# Patient Record
Sex: FEMALE | Race: WHITE | NOT HISPANIC OR LATINO | Employment: FULL TIME | URBAN - METROPOLITAN AREA
[De-identification: names, ages, dates, MRNs, and addresses within clinical notes are randomized per-mention and may not be internally consistent; named-entity substitution may affect disease eponyms.]

---

## 2018-05-01 ENCOUNTER — HISTORICAL (OUTPATIENT)
Dept: ADMINISTRATIVE | Facility: HOSPITAL | Age: 53
End: 2018-05-01

## 2018-05-01 LAB
ABS NEUT (OLG): 4.3
ALBUMIN SERPL-MCNC: 4.1 GM/DL (ref 3.4–5)
ALBUMIN/GLOB SERPL: 1.58 {RATIO} (ref 1.5–2.5)
ALP SERPL-CCNC: 48 UNIT/L (ref 38–126)
ALT SERPL-CCNC: 11 UNIT/L (ref 7–52)
APPEARANCE, UA: ABNORMAL
AST SERPL-CCNC: 15 UNIT/L (ref 15–37)
BACTERIA #/AREA URNS AUTO: ABNORMAL /HPF
BILIRUB SERPL-MCNC: 0.5 MG/DL (ref 0.2–1)
BILIRUB UR QL STRIP: NEGATIVE MG/DL
BILIRUBIN DIRECT+TOT PNL SERPL-MCNC: 0.1 MG/DL (ref 0–0.5)
BUN SERPL-MCNC: 13 MG/DL (ref 7–18)
CALCIUM SERPL-MCNC: 9 MG/DL (ref 8.5–10)
CHLORIDE SERPL-SCNC: 105 MMOL/L (ref 98–107)
CHOLEST SERPL-MCNC: 187 MG/DL (ref 0–200)
CHOLEST/HDLC SERPL: 3.1 {RATIO}
CO2 SERPL-SCNC: 32 MMOL/L (ref 21–32)
COLOR UR: YELLOW
CREAT SERPL-MCNC: 0.71 MG/DL (ref 0.6–1.3)
CREAT/UREA NIT SERPL: 18.3
DEPRECATED CALCIDIOL+CALCIFEROL SERPL-MC: 32 NG/ML (ref 30–80)
ERYTHROCYTE [DISTWIDTH] IN BLOOD BY AUTOMATED COUNT: 12.3 % (ref 11.5–17)
GGT SERPL-CCNC: 10 UNIT/L (ref 5–85)
GLOBULIN SER-MCNC: 2.6 GM/DL (ref 1.2–3)
GLUCOSE (UA): NEGATIVE MG/DL
GLUCOSE SERPL-MCNC: 98 MG/DL (ref 74–106)
HCT VFR BLD AUTO: 42.5 % (ref 37–47)
HDLC SERPL-MCNC: 61 MG/DL (ref 35–60)
HGB BLD-MCNC: 14 GM/DL (ref 12–16)
HGB UR QL STRIP: ABNORMAL UNIT/L
KETONES UR QL STRIP: NEGATIVE MG/DL
LDH SERPL-CCNC: 156 UNIT/L (ref 140–271)
LDLC SERPL CALC-MCNC: 100 MG/DL (ref 0–129)
LEUKOCYTE ESTERASE UR QL STRIP: NEGATIVE UNIT/L
LYMPHOCYTES # BLD AUTO: 2.5 X10(3)/MCL (ref 0.6–3.4)
LYMPHOCYTES NFR BLD AUTO: 32.4 % (ref 13–40)
MCH RBC QN AUTO: 31 PG (ref 27–31.2)
MCHC RBC AUTO-ENTMCNC: 33 GM/DL (ref 32–36)
MCV RBC AUTO: 94 FL (ref 80–94)
MONOCYTES # BLD AUTO: 0.8 X10(3)/MCL (ref 0–1.8)
MONOCYTES NFR BLD AUTO: 11 % (ref 0.1–24)
NEUTROPHILS NFR BLD AUTO: 56.6 % (ref 47–80)
NITRITE UR QL STRIP.AUTO: NEGATIVE
PH UR STRIP: 7 [PH]
PLATELET # BLD AUTO: 431 X10(3)/MCL (ref 130–400)
PMV BLD AUTO: 9.1 FL
POTASSIUM SERPL-SCNC: 4.4 MMOL/L (ref 3.5–5.1)
PROT SERPL-MCNC: 6.7 GM/DL (ref 6.4–8.2)
PROT UR QL STRIP: NEGATIVE MG/DL
RBC # BLD AUTO: 4.51 X10(6)/MCL (ref 4.2–5.4)
RBC #/AREA URNS HPF: ABNORMAL /HPF
SODIUM SERPL-SCNC: 138 MMOL/L (ref 136–145)
SP GR UR STRIP: 1.02
SQUAMOUS EPITHELIAL, UA: ABNORMAL /LPF
TRIGL SERPL-MCNC: 66 MG/DL (ref 30–150)
TSH SERPL-ACNC: 1.66 MIU/ML (ref 0.35–4.94)
UROBILINOGEN UR STRIP-ACNC: 0.2 MG/DL
VLDLC SERPL CALC-MCNC: 13.2 MG/DL
WBC # SPEC AUTO: 7.6 X10(3)/MCL (ref 4.5–11.5)
WBC #/AREA URNS AUTO: ABNORMAL /[HPF]

## 2018-07-17 ENCOUNTER — HISTORICAL (OUTPATIENT)
Dept: LAB | Facility: HOSPITAL | Age: 53
End: 2018-07-17

## 2019-05-09 ENCOUNTER — HISTORICAL (OUTPATIENT)
Dept: ADMINISTRATIVE | Facility: HOSPITAL | Age: 54
End: 2019-05-09

## 2019-05-09 LAB
ABS NEUT (OLG): 4.6 X10(3)/MCL (ref 2.1–9.2)
ALBUMIN SERPL-MCNC: 4.1 GM/DL (ref 3.4–5)
ALBUMIN/GLOB SERPL: 1.41 {RATIO} (ref 1.5–2.5)
ALP SERPL-CCNC: 67 UNIT/L (ref 38–126)
ALT SERPL-CCNC: 12 UNIT/L (ref 7–52)
APPEARANCE, UA: CLEAR
AST SERPL-CCNC: 17 UNIT/L (ref 15–37)
BACTERIA #/AREA URNS AUTO: ABNORMAL /HPF
BILIRUB SERPL-MCNC: 0.4 MG/DL (ref 0.2–1)
BILIRUB UR QL STRIP: NEGATIVE MG/DL
BILIRUBIN DIRECT+TOT PNL SERPL-MCNC: 0.1 MG/DL (ref 0–0.5)
BILIRUBIN DIRECT+TOT PNL SERPL-MCNC: 0.3 MG/DL
BUN SERPL-MCNC: 11 MG/DL (ref 7–18)
CALCIUM SERPL-MCNC: 9.2 MG/DL (ref 8.5–10)
CHLORIDE SERPL-SCNC: 102 MMOL/L (ref 98–107)
CHOLEST SERPL-MCNC: 178 MG/DL (ref 0–200)
CHOLEST/HDLC SERPL: 2.4 {RATIO}
CO2 SERPL-SCNC: 32 MMOL/L (ref 21–32)
COLOR UR: ABNORMAL
CREAT SERPL-MCNC: 0.72 MG/DL (ref 0.6–1.3)
DEPRECATED CALCIDIOL+CALCIFEROL SERPL-MC: 34.2 NG/ML (ref 30–80)
ERYTHROCYTE [DISTWIDTH] IN BLOOD BY AUTOMATED COUNT: 12.8 % (ref 11.5–17)
GLOBULIN SER-MCNC: 2.9 GM/DL (ref 1.2–3)
GLUCOSE (UA): NEGATIVE MG/DL
GLUCOSE SERPL-MCNC: 99 MG/DL (ref 74–106)
HCT VFR BLD AUTO: 39.4 % (ref 37–47)
HDLC SERPL-MCNC: 74 MG/DL (ref 35–60)
HGB BLD-MCNC: 13.2 GM/DL (ref 12–16)
HGB UR QL STRIP: ABNORMAL UNIT/L
KETONES UR QL STRIP: NEGATIVE MG/DL
LDLC SERPL CALC-MCNC: 77 MG/DL (ref 0–129)
LEUKOCYTE ESTERASE UR QL STRIP: NEGATIVE UNIT/L
LYMPHOCYTES # BLD AUTO: 3 X10(3)/MCL (ref 0.6–3.4)
LYMPHOCYTES NFR BLD AUTO: 36 % (ref 13–40)
MCH RBC QN AUTO: 30.9 PG (ref 27–31.2)
MCHC RBC AUTO-ENTMCNC: 34 GM/DL (ref 32–36)
MCV RBC AUTO: 92 FL (ref 80–94)
MONOCYTES # BLD AUTO: 0.7 X10(3)/MCL (ref 0.1–1.3)
MONOCYTES NFR BLD AUTO: 8.9 % (ref 0.1–24)
NEUTROPHILS NFR BLD AUTO: 55.1 % (ref 47–80)
NITRITE UR QL STRIP.AUTO: NEGATIVE
PH UR STRIP: 8 [PH]
PLATELET # BLD AUTO: 405 X10(3)/MCL (ref 130–400)
PMV BLD AUTO: 8.2 FL (ref 9.4–12.4)
POTASSIUM SERPL-SCNC: 4.3 MMOL/L (ref 3.5–5.1)
PROT SERPL-MCNC: 7 GM/DL (ref 6.4–8.2)
PROT UR QL STRIP: NEGATIVE MG/DL
RBC # BLD AUTO: 4.27 X10(6)/MCL (ref 4.2–5.4)
RBC #/AREA URNS HPF: ABNORMAL /HPF
SODIUM SERPL-SCNC: 138 MMOL/L (ref 136–145)
SP GR UR STRIP: 1.01
SQUAMOUS EPITHELIAL, UA: ABNORMAL /LPF
TRIGL SERPL-MCNC: 60 MG/DL (ref 30–150)
TSH SERPL-ACNC: 1.53 MIU/ML (ref 0.35–4.94)
UROBILINOGEN UR STRIP-ACNC: 0.2 MG/DL
VLDLC SERPL CALC-MCNC: 12 MG/DL
WBC # SPEC AUTO: 8.3 X10(3)/MCL (ref 4.5–11.5)
WBC #/AREA URNS AUTO: ABNORMAL /[HPF]

## 2020-05-12 ENCOUNTER — HISTORICAL (OUTPATIENT)
Dept: ADMINISTRATIVE | Facility: HOSPITAL | Age: 55
End: 2020-05-12

## 2020-05-12 LAB
ABS NEUT (OLG): 3.6 X10(3)/MCL (ref 2.1–9.2)
ALBUMIN SERPL-MCNC: 4.2 GM/DL (ref 3.4–5)
ALBUMIN/GLOB SERPL: 1.62 {RATIO} (ref 1.5–2.5)
ALP SERPL-CCNC: 58 UNIT/L (ref 38–126)
ALT SERPL-CCNC: 12 UNIT/L (ref 7–52)
APPEARANCE, UA: CLEAR
AST SERPL-CCNC: 18 UNIT/L (ref 15–37)
BACTERIA #/AREA URNS AUTO: ABNORMAL /HPF
BILIRUB SERPL-MCNC: 0.4 MG/DL (ref 0.2–1)
BILIRUB UR QL STRIP: NEGATIVE MG/DL
BILIRUBIN DIRECT+TOT PNL SERPL-MCNC: 0.1 MG/DL (ref 0–0.5)
BILIRUBIN DIRECT+TOT PNL SERPL-MCNC: 0.3 MG/DL
BUN SERPL-MCNC: 14 MG/DL (ref 7–18)
CALCIUM SERPL-MCNC: 9.6 MG/DL (ref 8.5–10)
CHLORIDE SERPL-SCNC: 103 MMOL/L (ref 98–107)
CHOLEST SERPL-MCNC: 171 MG/DL (ref 0–200)
CHOLEST/HDLC SERPL: 2.6 {RATIO}
CO2 SERPL-SCNC: 35 MMOL/L (ref 21–32)
COLOR UR: YELLOW
CREAT SERPL-MCNC: 0.65 MG/DL (ref 0.6–1.3)
DEPRECATED CALCIDIOL+CALCIFEROL SERPL-MC: 34.1 NG/ML (ref 30–80)
ERYTHROCYTE [DISTWIDTH] IN BLOOD BY AUTOMATED COUNT: 12.8 % (ref 11.5–17)
GLOBULIN SER-MCNC: 2.6 GM/DL (ref 1.2–3)
GLUCOSE (UA): NEGATIVE MG/DL
GLUCOSE SERPL-MCNC: 99 MG/DL (ref 74–106)
HCT VFR BLD AUTO: 42.1 % (ref 37–47)
HDLC SERPL-MCNC: 67 MG/DL (ref 35–60)
HGB BLD-MCNC: 13.8 GM/DL (ref 12–16)
HGB UR QL STRIP: ABNORMAL UNIT/L
KETONES UR QL STRIP: NEGATIVE MG/DL
LDLC SERPL CALC-MCNC: 90 MG/DL (ref 0–129)
LEUKOCYTE ESTERASE UR QL STRIP: NEGATIVE UNIT/L
LYMPHOCYTES # BLD AUTO: 2.7 X10(3)/MCL (ref 0.6–3.4)
LYMPHOCYTES NFR BLD AUTO: 39 % (ref 13–40)
MCH RBC QN AUTO: 30.1 PG (ref 27–31.2)
MCHC RBC AUTO-ENTMCNC: 33 GM/DL (ref 32–36)
MCV RBC AUTO: 92 FL (ref 80–94)
MONOCYTES # BLD AUTO: 0.7 X10(3)/MCL (ref 0.1–1.3)
MONOCYTES NFR BLD AUTO: 9.3 % (ref 0.1–24)
NEUTROPHILS NFR BLD AUTO: 51.7 % (ref 47–80)
NITRITE UR QL STRIP.AUTO: NEGATIVE
PH UR STRIP: 7.5 [PH]
PLATELET # BLD AUTO: 407 X10(3)/MCL (ref 130–400)
PMV BLD AUTO: 8.9 FL (ref 9.4–12.4)
POTASSIUM SERPL-SCNC: 4.7 MMOL/L (ref 3.5–5.1)
PROT SERPL-MCNC: 6.8 GM/DL (ref 6.4–8.2)
PROT UR QL STRIP: NEGATIVE MG/DL
RBC # BLD AUTO: 4.59 X10(6)/MCL (ref 4.2–5.4)
RBC #/AREA URNS HPF: ABNORMAL /HPF
SODIUM SERPL-SCNC: 141 MMOL/L (ref 136–145)
SP GR UR STRIP: 1.01
SQUAMOUS EPITHELIAL, UA: ABNORMAL /LPF
TRIGL SERPL-MCNC: 53 MG/DL (ref 30–150)
TSH SERPL-ACNC: 1.11 MIU/ML (ref 0.35–4.94)
UROBILINOGEN UR STRIP-ACNC: 0.2 MG/DL
VLDLC SERPL CALC-MCNC: 10.6 MG/DL
WBC # SPEC AUTO: 7 X10(3)/MCL (ref 4.5–11.5)
WBC #/AREA URNS AUTO: ABNORMAL /[HPF]

## 2020-08-07 LAB
ABS NEUT (OLG): 5.07 X10(3)/MCL (ref 2.1–9.2)
ALBUMIN SERPL-MCNC: 4.1 GM/DL (ref 3.5–5)
ALBUMIN/GLOB SERPL: 1.4 RATIO (ref 1.1–2)
ALP SERPL-CCNC: 77 UNIT/L (ref 40–150)
ALT SERPL-CCNC: 14 UNIT/L (ref 0–55)
AST SERPL-CCNC: 19 UNIT/L (ref 5–34)
BASOPHILS # BLD AUTO: 0.1 X10(3)/MCL (ref 0–0.2)
BASOPHILS NFR BLD AUTO: 1 %
BILIRUB SERPL-MCNC: 0.7 MG/DL
BILIRUBIN DIRECT+TOT PNL SERPL-MCNC: 0.3 MG/DL (ref 0–0.5)
BILIRUBIN DIRECT+TOT PNL SERPL-MCNC: 0.4 MG/DL (ref 0–0.8)
BUN SERPL-MCNC: 11.3 MG/DL (ref 9.8–20.1)
CALCIUM SERPL-MCNC: 9.4 MG/DL (ref 8.4–10.2)
CHLORIDE SERPL-SCNC: 104 MMOL/L (ref 98–107)
CO2 SERPL-SCNC: 31 MMOL/L (ref 22–29)
CREAT SERPL-MCNC: 0.75 MG/DL (ref 0.55–1.02)
EOSINOPHIL # BLD AUTO: 0.3 X10(3)/MCL (ref 0–0.9)
EOSINOPHIL NFR BLD AUTO: 4 %
ERYTHROCYTE [DISTWIDTH] IN BLOOD BY AUTOMATED COUNT: 12.8 % (ref 11.5–17)
GLOBULIN SER-MCNC: 3 GM/DL (ref 2.4–3.5)
GLUCOSE SERPL-MCNC: 93 MG/DL (ref 74–100)
HCT VFR BLD AUTO: 43.6 % (ref 37–47)
HGB BLD-MCNC: 14.2 GM/DL (ref 12–16)
LYMPHOCYTES # BLD AUTO: 2.6 X10(3)/MCL (ref 0.6–4.6)
LYMPHOCYTES NFR BLD AUTO: 29 %
MCH RBC QN AUTO: 30.5 PG (ref 27–31)
MCHC RBC AUTO-ENTMCNC: 32.6 GM/DL (ref 33–36)
MCV RBC AUTO: 93.6 FL (ref 80–94)
MONOCYTES # BLD AUTO: 0.8 X10(3)/MCL (ref 0.1–1.3)
MONOCYTES NFR BLD AUTO: 9 %
NEUTROPHILS # BLD AUTO: 5.07 X10(3)/MCL (ref 2.1–9.2)
NEUTROPHILS NFR BLD AUTO: 57 %
PLATELET # BLD AUTO: 424 X10(3)/MCL (ref 130–400)
PMV BLD AUTO: 9.5 FL (ref 9.4–12.4)
POTASSIUM SERPL-SCNC: 4.5 MMOL/L (ref 3.5–5.1)
PROT SERPL-MCNC: 7.1 GM/DL (ref 6.4–8.3)
RBC # BLD AUTO: 4.66 X10(6)/MCL (ref 4.2–5.4)
SODIUM SERPL-SCNC: 141 MMOL/L (ref 136–145)
WBC # SPEC AUTO: 8.9 X10(3)/MCL (ref 4.5–11.5)

## 2020-08-12 ENCOUNTER — HISTORICAL (OUTPATIENT)
Dept: SURGERY | Facility: HOSPITAL | Age: 55
End: 2020-08-12

## 2020-08-12 LAB — POC BETA-HCG (QUAL): NEGATIVE

## 2021-05-24 ENCOUNTER — HISTORICAL (OUTPATIENT)
Dept: ADMINISTRATIVE | Facility: HOSPITAL | Age: 56
End: 2021-05-24

## 2021-05-24 LAB
ABS NEUT (OLG): 3.4 X10(3)/MCL (ref 2.1–9.2)
ALBUMIN SERPL-MCNC: 4.2 GM/DL (ref 3.4–5)
ALBUMIN/GLOB SERPL: 1.75 {RATIO} (ref 1.5–2.5)
ALP SERPL-CCNC: 75 UNIT/L (ref 38–126)
ALT SERPL-CCNC: 13 UNIT/L (ref 7–52)
APPEARANCE, UA: CLEAR
AST SERPL-CCNC: 20 UNIT/L (ref 15–37)
BACTERIA #/AREA URNS AUTO: ABNORMAL /HPF
BILIRUB SERPL-MCNC: 0.4 MG/DL (ref 0.2–1)
BILIRUB UR QL STRIP: NEGATIVE MG/DL
BILIRUBIN DIRECT+TOT PNL SERPL-MCNC: 0.1 MG/DL (ref 0–0.5)
BILIRUBIN DIRECT+TOT PNL SERPL-MCNC: 0.3 MG/DL
BUN SERPL-MCNC: 12 MG/DL (ref 7–18)
CALCIUM SERPL-MCNC: 9.5 MG/DL (ref 8.5–10)
CHLORIDE SERPL-SCNC: 102 MMOL/L (ref 98–107)
CHOLEST SERPL-MCNC: 172 MG/DL (ref 0–200)
CHOLEST/HDLC SERPL: 2.4 {RATIO}
CO2 SERPL-SCNC: 33 MMOL/L (ref 21–32)
COLOR UR: YELLOW
CREAT SERPL-MCNC: 0.7 MG/DL (ref 0.6–1.3)
DEPRECATED CALCIDIOL+CALCIFEROL SERPL-MC: 38.9 NG/ML (ref 30–80)
ERYTHROCYTE [DISTWIDTH] IN BLOOD BY AUTOMATED COUNT: 12.4 % (ref 11.5–17)
EST CREAT CLEARANCE SER (OHS): 93.26 ML/MIN
EST. AVERAGE GLUCOSE BLD GHB EST-MCNC: 108 MG/DL
GLOBULIN SER-MCNC: 2.4 GM/DL (ref 1.2–3)
GLUCOSE (UA): NEGATIVE MG/DL
GLUCOSE SERPL-MCNC: 111 MG/DL (ref 74–106)
HBA1C MFR BLD: 5.4 % (ref 4.4–6.4)
HCT VFR BLD AUTO: 40.5 % (ref 37–47)
HDLC SERPL-MCNC: 71 MG/DL (ref 35–60)
HGB BLD-MCNC: 13.5 GM/DL (ref 12–16)
HGB UR QL STRIP: ABNORMAL UNIT/L
KETONES UR QL STRIP: NEGATIVE MG/DL
LDLC SERPL CALC-MCNC: 85 MG/DL (ref 0–129)
LEUKOCYTE ESTERASE UR QL STRIP: NEGATIVE UNIT/L
LYMPHOCYTES # BLD AUTO: 2.2 X10(3)/MCL (ref 0.6–3.4)
LYMPHOCYTES NFR BLD AUTO: 35.1 % (ref 13–40)
MCH RBC QN AUTO: 30.5 PG (ref 27–31.2)
MCHC RBC AUTO-ENTMCNC: 33 GM/DL (ref 32–36)
MCV RBC AUTO: 92 FL (ref 80–94)
MONOCYTES # BLD AUTO: 0.7 X10(3)/MCL (ref 0.1–1.3)
MONOCYTES NFR BLD AUTO: 11.6 % (ref 0.1–24)
NEUTROPHILS NFR BLD AUTO: 53.3 % (ref 47–80)
NITRITE UR QL STRIP.AUTO: NEGATIVE
PH UR STRIP: 8 [PH]
PLATELET # BLD AUTO: 395 X10(3)/MCL (ref 130–400)
PMV BLD AUTO: 8.6 FL (ref 9.4–12.4)
POTASSIUM SERPL-SCNC: 4.6 MMOL/L (ref 3.5–5.1)
PROT SERPL-MCNC: 6.6 GM/DL (ref 6.4–8.2)
PROT UR QL STRIP: NEGATIVE MG/DL
RBC # BLD AUTO: 4.42 X10(6)/MCL (ref 4.2–5.4)
RBC #/AREA URNS HPF: ABNORMAL /HPF
SODIUM SERPL-SCNC: 141 MMOL/L (ref 136–145)
SP GR UR STRIP: 1.02
SQUAMOUS EPITHELIAL, UA: ABNORMAL /LPF
TRIGL SERPL-MCNC: 55 MG/DL (ref 30–150)
TSH SERPL-ACNC: 1.46 MIU/ML (ref 0.35–4.94)
UROBILINOGEN UR STRIP-ACNC: 0.2 MG/DL
VLDLC SERPL CALC-MCNC: 11 MG/DL
WBC # SPEC AUTO: 6.3 X10(3)/MCL (ref 4.5–11.5)
WBC #/AREA URNS AUTO: ABNORMAL /[HPF]

## 2022-04-10 ENCOUNTER — HISTORICAL (OUTPATIENT)
Dept: ADMINISTRATIVE | Facility: HOSPITAL | Age: 57
End: 2022-04-10

## 2022-04-27 VITALS
SYSTOLIC BLOOD PRESSURE: 120 MMHG | WEIGHT: 141.75 LBS | DIASTOLIC BLOOD PRESSURE: 64 MMHG | HEIGHT: 68 IN | BODY MASS INDEX: 21.48 KG/M2

## 2022-04-30 NOTE — OP NOTE
Rj Ho MD      Patient:   Marina Samaniego            MRN: [id number]            FIN: [id number]               Age:   54 years     Sex:  Female     :  1965   Associated Diagnoses:   None   Author:   Rj Ho MD      Surgeon: Rj Ho MD    Assistant: Fariba ANNA    Preoperative Diagnosis: Right breast ADH    Postoperative Diagnosis:  Same    Procedure: Breast ultrasound-guided magseed localization and excisional biopsy    Anesthesia: Gen. endotracheal anesthesia    Estimated Blood Loss: Less than 25 cc    Intra-operative Findings:  The area of concern and previously placed hydro-kyle at the biopsy site was targeted with intraoperative ultrasound, mag Seed localization was performed followed by excisional biopsy.  Specimen radiograph confirmed the marking clip within the specimen.    Procedure in Detail:  After informed consent was obtained the patient was brought to the operating room and placed in the supine position.  General endotracheal anesthesia was administered without difficulty.  The breast was prepped and draped in sterile fashion.  Focused breast ultrasound was then performed and identified the previously placed hydro-kyle at the biopsy site.  Under ultrasound guidance I performed magseed localization of the hydro-kyle.  An incision was then made over this area after infiltration with local anesthesia and the area around the wire was excised using Bovie cautery.  The specimen was marked with sutures long lateral and short superior.  A specimen radiograph was performed  and the hydro-kyle and mag Seed were clearly visible within the specimen indicating adequate excisional biopsy.  The specimen was sent for histopathological evaluation.  Meticulous hemostasis was achieved.  The wound was irrigated with antimicrobial solution and closed in multiple layers with absorbable suture.  Sterile dressings were applied.  The patient tolerated the procedure well.

## 2022-05-03 NOTE — HISTORICAL [facility] CERNER
This is a historical note converted from Sneha. Formatting and pictures may have been removed.  Please reference Sneha for original formatting and attached multimedia. Chief Complaint  CPX/FASTING  History of Present Illness  Patient is here today for wellness CPX.? She is tolerating pravastatin without side effects. ?She has been exercising regularly?since working at home over the last year?by jogging 5 days a week.? She did have a breast biopsy last year?that returned positive for atypical ductal hyperplasia.? She had this area removed.? She has not seen a gynecologist in several years and would like a referral.  Review of Systems  GENERAL:??no?unexplained wtloss?3lbs , fever, fatigue, chills,+rare ?night sweats or weakness  HEENT: no? sore throat, ear pain, sinus pressure, nasal congestion, or rhinorrhea, +AR  VISION:?no vision changes, glaucoma, cataracts, +glasses, hx lens dislocation  CARDIAC: no?chest pain,?palpitations,?Dyspnea on exertion,?orthopnea  RESPIRATORY:?no?cough,?wheezing, sputum production,or?SOB--got covid vaccine  GI: no??abdominal pain,?n&v, constipation,?diarrhea,??blood in stool or_?no family history of colon cancer_  :?no? dysuria, hematuria, frequency, ?urgency, incontinence,? vaginal discharge,? abn. vaginal bleeding, GYN visit overdue-requests referral  [facility]:? no? myalgia, weakness, edema,? arthralgia, or?joint effusion  SKIN:? No?rash, hives, ?itching or ?sores--sees Dr Bajwa  NEURO:? No headaches, numbness, ?tingling, weakness, or?dizziness  PSYCH:? No anxiety, ?depression, ?irritability, ?suicidal ideation or?hallucinations  ENDO:? No polyuria, polydipsia, ?polyphagia  HEME:? No?Bruising, ?lymphadenopathy, bleeding disorders ?or?signs of anemia  Physical Exam  Vitals & Measurements  HR:?68(Peripheral)? BP:?120/64?  HT:?172.70?cm? WT:?64.300?kg? BMI:?21.56?  GENERAL: NAD, alert and oriented x 3  SKIN:? no rash or abnormal appearing skin lesions  HEENT:? PERRLA, EOMI, mouth  wnl, throat wnl, EAC and TM wnl bilaterally  NECK:? FROM, no lymphadenopathy, no thyroid abnormalities palpable  CHEST:? CTA bilaterally no wheezes, crackles or rubs  CARDIAC:? RRR, no murmurs audible  ABDOMEN:? Soft, nontender, nondistended, NBSx4,?no rebound or guarding, no HSM  EXTREMITIES:? no clubbing, cyanosis, or edema.? joints wnl. +2 DP/PT pulse bilaterally  NEURO:? no sensory or motor deficits noted. CN II-XII intact. Gait wnl.?  GENITAL: defer to gyn none-request referral  Assessment/Plan  1.?Wellness examination?Z00.00  CBC, CMP, FLP, TSH, U/A, vit D, GYN-none/Pugliesi in past, set up MMG after 7/1/2021--had breast bx last year, colonoscopy 2019 due 2024, Encourage pt to increase cardiovascular exercise and attempt to obtain at least 150 minutes of moderate aerobic exercise per week or 75 minutes of vigorous aerobic exercise weekly.? Recommend at least 2 days of weight bearing exercise to help increase bone density.  Ordered:  CBC w/ Auto Diff, Routine collect, 05/24/21 8:04:00 CDT, Blood, Order for future visit, Stop date 05/24/21 8:04:00 CDT, Lab Collect, Wellness examination, 05/24/21 8:04:00 CDT  Clinic Follow-Up Wellness, *Est. 05/24/22 3:00:00 CDT, Order for future visit, Wellness examination  Hypercholesterolemia  Vitamin D deficiency, HLink AFP  Comprehensive Metabolic Panel, Routine collect, 05/24/21 8:04:00 CDT, Blood, Order for future visit, Stop date 05/24/21 8:04:00 CDT, Lab Collect, Wellness examination  Hypercholesterolemia, 05/24/21 8:04:00 CDT  External Referral, annual gyn, Dr Rajan son, 05/24/21 8:04:00 CDT, Wellness examination  Lab Collection Request, 05/24/21 8:04:00 CDT, HLINK AMB - AFP, 05/24/21 8:04:00 CDT, Wellness examination  Lipid Panel, Routine collect, 05/24/21 8:04:00 CDT, Blood, Order for future visit, Stop date 05/24/21 8:04:00 CDT, Lab Collect, Wellness examination  Hypercholesterolemia, 05/24/21 8:04:00 CDT  Preventative Health Care Est 40-64 years [id number] PC,  Wellness examination  Hypercholesterolemia  Vitamin D deficiency, HLINK AMB - AFP, 05/24/21 8:04:00 CDT  Schedule Diagnostics Study, screening MMG, [facility]--after July 1st, 05/24/21 8:05:00 CDT, Wellness examination  Thyroid Stimulating Hormone, Routine collect, 05/24/21 8:04:00 CDT, Blood, Order for future visit, Stop date 05/24/21 8:04:00 CDT, Lab Collect, Wellness examination  Hypercholesterolemia, 05/24/21 8:04:00 CDT  Urinalysis no Reflex, Routine collect, Urine, Order for future visit, 05/24/21 8:04:00 CDT, Stop date 05/24/21 8:04:00 CDT, Nurse collect, Wellness examination  Vitamin D, 25-Hydroxy Level, Routine collect, 05/24/21 8:04:00 CDT, Blood, Order for future visit, Stop date 05/24/21 8:04:00 CDT, Lab Collect, Wellness examination  Vitamin D deficiency, 05/24/21 8:04:00 CDT  ?  2.?Encounter for immunization?Z23  got both covid vaccines, consider?Shingrix  ?  3.?Hypercholesterolemia?E78.00  check FLP, comntinue Pravastatin 40 mg  Ordered:  Clinic Follow-Up Wellness, *Est. 05/24/22 3:00:00 CDT, Order for future visit, Wellness examination  Hypercholesterolemia  Vitamin D deficiency, HLink AFP  Comprehensive Metabolic Panel, Routine collect, 05/24/21 8:04:00 CDT, Blood, Order for future visit, Stop date 05/24/21 8:04:00 CDT, Lab Collect, Wellness examination  Hypercholesterolemia, 05/24/21 8:04:00 CDT  Lipid Panel, Routine collect, 05/24/21 8:04:00 CDT, Blood, Order for future visit, Stop date 05/24/21 8:04:00 CDT, Lab Collect, Wellness examination  Hypercholesterolemia, 05/24/21 8:04:00 CDT  Preventative Health Care Est 40-64 years [id number] PC, Wellness examination  Hypercholesterolemia  Vitamin D deficiency, HLINK AMB - AFP, 05/24/21 8:04:00 CDT  Thyroid Stimulating Hormone, Routine collect, 05/24/21 8:04:00 CDT, Blood, Order for future visit, Stop date 05/24/21 8:04:00 CDT, Lab Collect, Wellness examination  Hypercholesterolemia, 05/24/21 8:04:00 CDT  ?  4.?Vitamin D deficiency?E55.9  check vit D,  continue vit D supplementation  Ordered:  Clinic Follow-Up Wellness, *Est. 22 3:00:00 CDT, Order for future visit, Wellness examination  Hypercholesterolemia  Vitamin D deficiency, HLink AFP  Preventative Health Care Est 40-64 years [id number] PC, Wellness examination  Hypercholesterolemia  Vitamin D deficiency, HLINK AMB - AFP, 21 8:04:00 CDT  Vitamin D, 25-Hydroxy Level, Routine collect, 21 8:04:00 CDT, Blood, Order for future visit, Stop date 21 8:04:00 CDT, Lab Collect, Wellness examination  Vitamin D deficiency, 21 8:04:00 CDT  ?  Orders:  pravastatin, 40 mg = 1 tab(s), Oral, Daily, # 90 tab(s), 3 Refill(s), Pharmacy: [facility], 172.7, cm, Height/Length Dosing, 21 7:53:00 CDT, 64.3, kg, Weight Dosing, 21 7:53:00 CDT  Referrals  External Referral, annual gyn, Dr Rajan son, 21 8:04:00 CDT, Wellness examination  Clinic Follow-Up Wellness, *Est. 22 3:00:00 CDT, Order for future visit, Wellness examination  Hypercholesterolemia  Vitamin D deficiency, HLink AFP   Problem List/Past Medical History  Ongoing  Acute URI  Breast mass  Hypercholesterolemia  Rash  Wellness examination  Historical  Hypercholesterolemia  Spontaneous dislocation of lens  Vitamin D deficiency  Procedure/Surgical History  Biopsy Breast With Localization (Right) (2020)  Excision of breast lesion identified by preoperative placement of radiological marker, open; single lesion (2020)  Excision of Right Breast, Open Approach, Diagnostic (2020)  breast biopsy (07/10/2020)  Colonoscopy (2019)  Left varicose vein ablation ()   section  Hemorrhoidectomy  Refractive lensectomy with intraocular lens implantation   Medications  Hair, Skin & Nails 5 mg oral capsule, 5 mg= 1 cap(s), Oral, Daily  pravastatin 40 mg oral tablet, 40 mg= 1 tab(s), Oral, Daily, 3 refills  Sudafed 30 mg oral tablet, 30 mg= 1 tab(s), Oral, q6hr, PRN  Zyrtec 10 mg oral tablet, 10  mg= 1 tab(s), Oral, Daily  Allergies  LamISIL?(HIVES)  Social History  Abuse/Neglect  No, 05/24/2021  No, No, Yes, 08/12/2020  No, 05/12/2020  Alcohol  Current, Wine, Daily, 08/12/2020  Current, Beer, 1-2 times per week, 04/27/2018  Employment/School  Employed, 08/10/2020  Previous employment/school: [employer]., 04/27/2018  Home/Environment  Lives with Children, Spouse., 04/27/2018  Nutrition/Health  Regular, 08/10/2020  Spiritual/Cultural  Latter-day, 08/10/2020  Substance Use  Never, 08/12/2020  Tobacco  Former smoker, quit more than 30 days ago, No, 05/24/2021  Former smoker, quit more than 30 days ago, Yes, 08/12/2020  Former smoker, quit more than 30 days ago, N/A, 05/12/2020  Former smoker, quit more than 30 days ago, N/A, 05/09/2019  Former smoker, quit more than 30 days ago, N/A, 04/10/2019  Former smoker, Cigarettes, 05/01/2018  Family History  CA - Cancer of colon: Negative: Mother.  CVA - Cerebrovascular accident: Mother.  Coronary artery bypass grafts x 3: Mother (Dx at 49).  Depression.: Mother and Father.  Diabetes mellitus: Father.  High cholesterol: Mother and Father.  Hyperlipidemia.: Sister.  Hypertension.: Mother and Father.  Renal cell carcinoma: Mother.  Renal failure: Mother.  Immunizations  Vaccine Date Status   COVID-19 MRNA, LNP-S, PF- Pfizer 04/21/2021 Given   COVID-19 MRNA, LNP-S, PF- Pfizer 03/31/2021 Given   influenza virus vaccine, inactivated 10/15/2017 Recorded   influenza virus vaccine, inactivated 10/11/2015 Recorded   tetanus/diphth/pertuss (Tdap) adult/adol 04/2015 Recorded   Health Maintenance  Health Maintenance  ???Pending?(in the next year)  ??? ??OverDue  ??? ? ? ?Influenza Vaccine due??10/01/20??and every 1??day(s)  ??? ? ? ?Alcohol Misuse Screening due??01/02/21??and every 1??year(s)  ??? ??Due?  ??? ? ? ?ADL Screening due??05/24/21??and every 1??year(s)  ??? ? ? ?Depression Screening due??05/24/21??Unknown Frequency  ??? ? ? ?Zoster Vaccine  due??05/24/21??Unknown Frequency  ??? ??Due In Future?  ??? ? ? ?Cervical Cancer Screening not due until??07/15/21??and every 3??year(s)  ??? ? ? ?Obesity Screening not due until??01/01/22??and every 1??year(s)  ???Satisfied?(in the past 1 year)  ??? ??Satisfied?  ??? ? ? ?Blood Pressure Screening on??05/24/21.??Satisfied by Mesha Olguin CMA  ??? ? ? ?Body Mass Index Check on??05/24/21.??Satisfied by Mesha Olguin CMA  ??? ? ? ?Breast Cancer Screening on??07/01/20.??Satisfied by Karissa Purdy  ??? ? ? ?Coronary Artery Disease Maintenance-Lipid Lowering Therapy on??05/24/21.??Satisfied by Iggy Escobar MD  ??? ? ? ?Diabetes Screening on??08/07/20.??Satisfied by Ally Manzanares MT.  ??? ? ? ?Obesity Screening on??05/24/21.??Satisfied by Mesha Olguin CMA  ?

## 2022-05-03 NOTE — HISTORICAL [facility] CERNER
This is a historical note converted from Sneha. Formatting and pictures may have been removed.  Please reference Sneha for original formatting and attached multimedia. Chief Complaint  Annual Wellness  History of Present Illness  Patient is here today for wellness CPX.? She saw her cardiologist Dr. Cade?last year and was started on pravastatin. ?She is tolerating this medication without side effects.? She continues to exercise?by walking?40 minutes 4 times a week.  Review of Systems  GENERAL:??no?unexplained [id number] , no ?fever, fatigue, chills, night sweats or weakness  HEENT: no? sore throat, ear pain, sinus pressure, nasal congestion, or rhinorrhea  VISION:?no vision changes, glaucoma, cataracts, +glasses, hx lens dislocation  CARDIAC: no?chest pain,?palpitations,?Dyspnea on exertion,?orthopnea--Sees DR Cade  RESPIRATORY:?no?cough,?wheezing, sputum production,or?SOB  GI: no??abdominal pain,?n&v, constipation,?diarrhea,??blood in stool or_no family history of colon cancer_  :?no? dysuria, hematuria, frequency, ?urgency, incontinence,? vaginal discharge,? abn. vaginal bleeding  MUSC/SKEL:? no? myalgia, weakness, edema,?+?right?elbow?arthralgia, or?no joint effusion  SKIN:? No?rash, hives, ?itching or ?sores  NEURO:? No headaches, numbness, ?tingling, weakness, or?dizziness  PSYCH:? No anxiety, ?depression, ?irritability, ?suicidal ideation or?hallucinations  ENDO:? No polyuria, polydipsia, ?polyphagia  HEME:? No?Bruising, ?lymphadenopathy, bleeding disorders ?or?signs of anemia  Physical Exam  Vitals & Measurements  HR:?72(Peripheral)? BP:?122/70?  HT:?172?cm? WT:?66.2?kg? BMI:?22.38?  GENERAL: NAD, alert and oriented x 3  SKIN:? no rash or abnormal appearing skin lesions  HEENT:? PERRLA, EOMI, mouth wnl, throat wnl, EAC and TM wnl bilaterally  NECK:? FROM, no lymphadenopathy, no thyroid abnormalities palpable  CHEST:? CTA bilaterally no wheezes, crackles or rubs  CARDIAC:? RRR, no murmurs audible  ABDOMEN:?  Soft, nontender, nondistended, NBSx4,?no rebound or guarding, no HSM  EXTREMITIES:? no clubbing, cyanosis, or edema.? joints wnl. +2 DP/PT pulse bilaterally  NEURO:? no sensory or motor deficits noted. CN II-XII intact. Gait wnl.?  GENITAL: defer to gyn( Dr Boothe)  Assessment/Plan  1.?Wellness examination?Z00.00  ?D/C ASA, CBC, CMP, FLP, U/A, TSH, Vit D, GYN/MMG( patient will set up), set up screening ?colonoscopy (declined last year),? Encourage pt to increase cardiovascular exercise and attempt to obtain at least 150 minutes of moderate aerobic exercise per week or 75 minutes of vigorous aerobic exercise weekly.? Recommend at least 2 days of weight bearing exercise to help increase bone density.  Ordered:  CBC w/ Auto Diff, Routine collect, 05/09/19 7:58:00 CDT, Blood, Order for future visit, Stop date 05/09/19 7:58:00 CDT, Lab Collect, Wellness examination, 05/09/19 7:58:00 CDT  Clinic Follow up, *Est. 05/09/20 3:00:00 CDT, Order for future visit, Wellness examination  Hypercholesterolemia  Vitamin D deficiency, HLink AFP  Comprehensive Metabolic Panel, Routine collect, 05/09/19 7:58:00 CDT, Blood, Order for future visit, Stop date 05/09/19 7:58:00 CDT, Lab Collect, Wellness examination  Hypercholesterolemia, 05/09/19 7:58:00 CDT  External Referral, screening colonoscopy, GI, Dr Garcia, 05/09/19 7:58:00 CDT, Wellness examination  Lab Collection Request, 05/09/19 7:58:00 CDT, HLINK AMB - AFP, 05/09/19 7:58:00 CDT  Lipid Panel, Routine collect, 05/09/19 7:58:00 CDT, Blood, Order for future visit, Stop date 05/09/19 7:58:00 CDT, Lab Collect, Wellness examination  Hypercholesterolemia, 05/09/19 7:58:00 CDT  Preventative Health Care Est 40-64 years [id number] PC, Wellness examination  Hypercholesterolemia  Vitamin D deficiency, HLINK AMB - AFP, 05/09/19 7:58:00 CDT  Thyroid Stimulating Hormone, Routine collect, 05/09/19 7:58:00 CDT, Blood, Order for future visit, Stop date 05/09/19 7:58:00 CDT, Lab Collect,  Wellness examination, 05/09/19 7:58:00 CDT  Urinalysis Complete no reflex, Routine collect, Urine, Order for future visit, 05/09/19 7:58:00 CDT, Stop date 05/09/19 7:58:00 CDT, Nurse collect, Wellness examination  Vitamin D, 25-Hydroxy Level, Routine collect, 05/09/19 7:58:00 CDT, Blood, Order for future visit, Stop date 05/09/19 7:58:00 CDT, Lab Collect, Wellness examination  Vitamin D deficiency, 05/09/19 7:58:00 CDT  ?  2.?Hypercholesterolemia?E78.00  ?continue pravastatin 40 mg  Ordered:  Clinic Follow up, *Est. 05/09/20 3:00:00 CDT, Order for future visit, Wellness examination  Hypercholesterolemia  Vitamin D deficiency, HLink AFP  Comprehensive Metabolic Panel, Routine collect, 05/09/19 7:58:00 CDT, Blood, Order for future visit, Stop date 05/09/19 7:58:00 CDT, Lab Collect, Wellness examination  Hypercholesterolemia, 05/09/19 7:58:00 CDT  Lipid Panel, Routine collect, 05/09/19 7:58:00 CDT, Blood, Order for future visit, Stop date 05/09/19 7:58:00 CDT, Lab Collect, Wellness examination  Hypercholesterolemia, 05/09/19 7:58:00 CDT  Preventative Health Care Est 40-64 years [id number] PC, Wellness examination  Hypercholesterolemia  Vitamin D deficiency, INK AMB - AFP, 05/09/19 7:58:00 CDT  ?  3.?Vitamin D deficiency?E55.9  ?check vit D  Ordered:  Clinic Follow up, *Est. 05/09/20 3:00:00 CDT, Order for future visit, Wellness examination  Hypercholesterolemia  Vitamin D deficiency, HLink AFP  Preventative Health Care Est 40-64 years [id number] PC, Wellness examination  Hypercholesterolemia  Vitamin D deficiency, INK AMB - AFP, 05/09/19 7:58:00 CDT  Vitamin D, 25-Hydroxy Level, Routine collect, 05/09/19 7:58:00 CDT, Blood, Order for future visit, Stop date 05/09/19 7:58:00 CDT, Lab Collect, Wellness examination  Vitamin D deficiency, 05/09/19 7:58:00 CDT  ?  Orders:  pravastatin, 40 mg = 1 tab(s), Oral, Daily, # 30 tab(s), 11 Refill(s), Pharmacy: [facility]  Referrals  External Referral,  screening colonoscopy, GI, Dr Garcia, 19 7:58:00 CDT, Wellness examination  Clinic Follow up, *Est. 20 3:00:00 CDT, Order for future visit, Wellness examination  Hypercholesterolemia  Vitamin D deficiency, HLink AFP   Problem List/Past Medical History  Ongoing  Acute URI  Hypercholesterolemia  Rash  Tobacco user  Vitamin D deficiency  Wellness examination  Historical  Hypercholesterolemia  Spontaneous dislocation of lens  Procedure/Surgical History  Left varicose vein ablation ()   section  Hemorrhoidectomy  Refractive lensectomy with intraocular lens implantation   Medications  biotin, Oral, Daily  pravastatin 40 mg oral tablet, 40 mg= 1 tab(s), Oral, Daily, 11 refills  Zyrtec 10 mg oral tablet, 10 mg= 1 tab(s), Oral, Daily  Allergies  LamISIL?(HIVES)  Social History  Alcohol  Current, Beer, 1-2 times per week, 2018  Employment/School  Previous employment/school: [employer]., 2018  Home/Environment  Lives with Children, Spouse., 2018  Tobacco  Former smoker, quit more than 30 days ago, N/A, 2019  Former smoker, quit more than 30 days ago, N/A, 04/10/2019  Former smoker, Cigarettes, 2018  Family History  CA - Cancer of colon: Negative: Mother.  CVA - Cerebrovascular accident: Mother.  Coronary artery bypass grafts x 3: Mother (Dx at 49).  Depression.: Mother and Father.  Diabetes mellitus: Father.  High cholesterol: Mother and Father.  Hyperlipidemia.: Sister.  Hypertension.: Mother and Father.  Renal cell carcinoma: Mother.  Renal failure: Mother.  Immunizations  Vaccine Date Status   influenza virus vaccine, inactivated 10/15/2017 Recorded   influenza virus vaccine, inactivated 10/11/2015 Recorded   tetanus/diphth/pertuss (Tdap) adult/adol 2015 Recorded   Health Maintenance  Health Maintenance  ???Pending?(in the next year)  ??? ??OverDue  ??? ? ? ?Coronary Artery Disease Maintenance-Antiplatelet Agent Prescribed due??and  every?  ??? ? ? ?Coronary Artery Disease Maintenance-Lipid Lowering Therapy due??and every?  ??? ? ? ?Diabetes Screening due??and every?  ??? ? ? ?Alcohol Misuse Screening due??01/01/19??and every 1??year(s)  ??? ? ? ?Smoking Cessation due??01/01/19??Variable frequency  ??? ??Due?  ??? ? ? ?ADL Screening due??05/09/19??and every 1??year(s)  ??? ? ? ?Colorectal Screening due??05/09/19??and every?  ??? ? ? ?Depression Screening due??05/09/19??and every?  ??? ??Due In Future?  ??? ? ? ?Breast Cancer Screening not due until??07/14/19??and every 2??year(s)  ??? ? ? ?Obesity Screening not due until??01/01/20??and every 1??year(s)  ??? ? ? ?Blood Pressure Screening not due until??05/08/20??and every 1??year(s)  ??? ? ? ?Body Mass Index Check not due until??05/08/20??and every 1??year(s)  ???Satisfied?(in the past 1 year)  ??? ??Satisfied?  ??? ? ? ?Blood Pressure Screening on??05/09/19.??Satisfied by Abigail Olguin CMAri L.  ??? ? ? ?Body Mass Index Check on??05/09/19.??Satisfied by Abigail Olguin CMAri L.  ??? ? ? ?Cervical Cancer Screening on??07/16/18.??Satisfied by oRopa Naik  ??? ? ? ?Coronary Artery Disease Maintenance-Lipid Lowering Therapy on??05/09/19.??Satisfied by Merrill Escobar MD  ??? ? ? ?Obesity Screening on??05/09/19.??Satisfied by Clau ROSALES Mesha L.  ?  ?

## 2022-05-03 NOTE — HISTORICAL [facility]ER
This is a historical note converted from Sneha. Formatting and pictures may have been removed.  Please reference Sneha for original formatting and attached multimedia. History of Present Illness  Patient presents for wellness CPX.? She admits to following healthy diet and increasing her physical activity over the last few months.? She c/o lump under her skin on her left side x1.5 years. She denies pain or discomfort, skin changes, size changes.  Review of Systems  GENERAL: No weight loss, no?weight gain, no fever, no fatigue, no chills, +night sweats  HEENT: No sore throat, no ear pain, no sinus pressure, no nasal congestion, no rhinorrhea, no decreased hearing, no snoring, +seasonal allergies  VISION: No vision changes, no blurry vision, no double vision, no glaucoma, no cataracts, +glasses, contacts  LAST EYE EXAM: 2020  NECK: no LAD  CARDIAC: No chest pain, no palpitations, no dyspnea on exertion, no orthopnea  RESPIRATORY: No cough, no wheezing, no sputum production, no?SOB  GI: No abdominal pain, no N/V, no constipation, no diarrhea, no blood in stool,?(-)?family history of Colon Ca  : No dysuria, no hematuria, no frequency, no urgency, no incontinence, no vaginal discharge or abnormal vaginal bleeding  MUSC/SKEL: No myalgia, no weakness, no edema, no arthralgia, no joint swelling/effusions  SKIN: No rashes, no hives, no itching, no sores  NEURO: No HA, no numbness, no tingling, no weakness, no dizziness  PSYCH: No anxiety, no depression, no?irritability, no panic attacks,?no s/i, no h/i, no?hallucinations  ENDO: No polyuria, no polyphagia,? no polydipsia  HEME: No bruising, no bleeding disorders, no signs of anemia.?  Physical Exam  Vitals & Measurements  T:?36.6? ?C (Oral)? HR:?64(Peripheral)? BP:?118/58?  HT:?172?cm? WT:?65.7?kg? BMI:?22.21?  General: Well developed, well nourished in no apparent distress, alert and oriented x3  Skin:?No rash or abnormal?lesions, mobile 7cm W?x 5 cm H?nontender mass  consistent with lipoma to LUQ  HEENT:?Normocephalic, PERRLA, EOMI, mouth WNL, throat WNL, nares normal, EAC and TM WNL bilaterally  Neck:?FROM, no LAD, no thyroid abnormalities?palpable  Chest:?CTA?bilaterally, no wheezes crackles or rubs  Cardiac: RRR,?no murmurs, rubs, gallops  Abdomen: Soft, nontender,?nondistended, NBSx4, no rebound tenderness or guarding, no HSM  Extremities:?No clubbing, cyanosis, or edema.? Joints WNL, +2 DP/PT pulses bilaterally  Neuro: No sensory or motor defects noted. CN II-XII intact. Gait WNL.  Genital: Not assessed--Defer to GYN  Assessment/Plan  1.?Wellness examination?Z00.00  CBC, CMP, FLP, U/A, TSH, Vit D ordered today.  GYN: none--she will set up. MMG--will order today.  GI: Radha DENNIS; Colonoscopy 2019, due 2024.  Encourage pt to increase cardiovascular exercise and attempt to obtain at least 150 minutes of moderate aerobic exercise per week or 75 minutes of vigorous aerobic exercise weekly.?  Recommend at least 2 days of weight bearing exercise to help increase bone density.  Ordered:  CBC w/ Auto Diff, Routine collect, 05/12/20 8:18:00 CDT, Blood, Order for future visit, Stop date 05/12/20 8:18:00 CDT, Lab Collect, Wellness examination  Hypercholesterolemia, 05/12/20 8:18:00 CDT  Clinic Follow-Up Wellness, *Est. 05/12/21 3:00:00 CDT, Order for future visit, Wellness examination  Hypercholesterolemia  Vitamin D deficiency, [facility]  Comprehensive Metabolic Panel, Routine collect, 05/12/20 8:18:00 CDT, Blood, Order for future visit, Stop date 05/12/20 8:18:00 CDT, Lab Collect, Wellness examination  Hypercholesterolemia, 05/12/20 8:18:00 CDT  Lab Collection Request, 05/12/20 8:18:00 CDT, [facility] - [facility], 05/12/20 8:18:00 CDT, Wellness examination  Hypercholesterolemia  Vitamin D deficiency  Lipid Panel, Routine collect, 05/12/20 8:18:00 CDT, Blood, Order for future visit, Stop date 05/12/20 8:18:00 CDT, Lab Collect, Wellness examination  Hypercholesterolemia, 05/12/20  8:18:00 CDT  Preventative Health Care Est 40-64 years 00565 PC, Wellness examination  Hypercholesterolemia  Vitamin D deficiency  Screening mammogram, encounter for, [facility] - [facility], 05/12/20 8:18:00 CDT  Thyroid Stimulating Hormone, Routine collect, 05/12/20 8:18:00 CDT, Blood, Order for future visit, Stop date 05/12/20 8:18:00 CDT, Lab Collect, Wellness examination, 05/12/20 8:18:00 CDT  Urinalysis without Reflex, Routine collect, Urine, Order for future visit, 05/12/20 8:18:00 CDT, Stop date 05/12/20 8:18:00 CDT, Nurse collect, Wellness examination  Vitamin D, 25-Hydroxy Level, Routine collect, 05/12/20 8:18:00 CDT, Blood, Order for future visit, Stop date 05/12/20 8:18:00 CDT, Lab Collect, Wellness examination  Vitamin D deficiency, 05/12/20 8:18:00 CDT  ?  2.?Hypercholesterolemia?E78.00  Continue current medicine. ?Follow low-cholesterol diet with?physical activity.??  Lipids today.  Ordered:  CBC w/ Auto Diff, Routine collect, 05/12/20 8:18:00 CDT, Blood, Order for future visit, Stop date 05/12/20 8:18:00 CDT, Lab Collect, Wellness examination  Hypercholesterolemia, 05/12/20 8:18:00 CDT  Clinic Follow-Up Wellness, *Est. 05/12/21 3:00:00 CDT, Order for future visit, Wellness examination  Hypercholesterolemia  Vitamin D deficiency, [facility]  Comprehensive Metabolic Panel, Routine collect, 05/12/20 8:18:00 CDT, Blood, Order for future visit, Stop date 05/12/20 8:18:00 CDT, Lab Collect, Wellness examination  Hypercholesterolemia, 05/12/20 8:18:00 CDT  Lab Collection Request, 05/12/20 8:18:00 CDT, [facility] - AFP, 05/12/20 8:18:00 CDT, Wellness examination  Hypercholesterolemia  Vitamin D deficiency  Lipid Panel, Routine collect, 05/12/20 8:18:00 CDT, Blood, Order for future visit, Stop date 05/12/20 8:18:00 CDT, Lab Collect, Wellness examination  Hypercholesterolemia, 05/12/20 8:18:00 CDT  Preventative Health Care Est 40-64 years 56863 PC, Wellness examination  Hypercholesterolemia  Vitamin D  deficiency  Screening mammogram, encounter for, [facility], 05/12/20 8:18:00 CDT  ?  3.?Vitamin D deficiency?E55.9  Vit D level today.  Currently only taking multivit at this time.  Ordered:  Clinic Follow-Up Wellness, *Est. 05/12/21 3:00:00 CDT, Order for future visit, Wellness examination  Hypercholesterolemia  Vitamin D deficiency, [facility]  Lab Collection Request, 05/12/20 8:18:00 CDT, [facility], 05/12/20 8:18:00 CDT, Wellness examination  Hypercholesterolemia  Vitamin D deficiency  Preventative Health Care Est 40-64 years [id number] PC, Wellness examination  Hypercholesterolemia  Vitamin D deficiency  Screening mammogram, encounter for, [facility] - [facility], 05/12/20 8:18:00 CDT  Vitamin D, 25-Hydroxy Level, Routine collect, 05/12/20 8:18:00 CDT, Blood, Order for future visit, Stop date 05/12/20 8:18:00 CDT, Lab Collect, Wellness examination  Vitamin D deficiency, 05/12/20 8:18:00 CDT  ?  4.?Screening mammogram, encounter for?Z12.31  Ordered today?for screening at?[facility].  Ordered:  Preventative Health Care Est 40-64 years [id number] PC, Wellness examination  Hypercholesterolemia  Vitamin D deficiency  Screening mammogram, encounter for, [facility], 05/12/20 8:18:00 CDT  Schedule Diagnostics Study, Screening MM[facility], [facility] on [address] 05/12/20 8:18:00 CDT, Screening mammogram, encounter for  ?  5.?Lipoma?D17.9  Will continue to monitor and FU.  Call clinic if increases in size or causes pain/discomfort.  ?  Orders:  pravastatin, 40 mg = 1 tab(s), Oral, Daily, # 90 tab(s), 3 Refill(s), Pharmacy: [facility], 172, cm, Height/Length Dosing, 05/12/20 7:53:00 CDT, 65.7, kg, Weight Dosing, 05/12/20 7:53:00 CDT  Referrals  Clinic Follow-Up Wellness, *Est. 05/12/21 3:00:00 CDT, Order for future visit, Wellness examination  Hypercholesterolemia  Vitamin D deficiency, [facility]   Problem List/Past Medical History  Ongoing  Acute URI  Hypercholesterolemia  Rash  Tobacco user  Vitamin D  deficiency  Wellness examination  Historical  Hypercholesterolemia  Spontaneous dislocation of lens  Procedure/Surgical History  Colonoscopy (2019)  Left varicose vein ablation ()   section  Hemorrhoidectomy  Refractive lensectomy with intraocular lens implantation   Medications  Hair, Skin & Nails 5 mg oral capsule, 5 mg= 1 cap(s), Oral, Daily  pravastatin 40 mg oral tablet, 40 mg= 1 tab(s), Oral, Daily, 3 refills  Zyrtec 10 mg oral tablet, 10 mg= 1 tab(s), Oral, Daily  Allergies  LamISIL?(HIVES)  Social History  Abuse/Neglect  No, 2020  Alcohol  Current, Beer, 1-2 times per week, 2018  Employment/School  Previous employment/school: [employer]., 2018  Home/Environment  Lives with Children, Spouse., 2018  Tobacco  Former smoker, quit more than 30 days ago, N/A, 2020  Former smoker, quit more than 30 days ago, N/A, 2019  Former smoker, quit more than 30 days ago, N/A, 04/10/2019  Former smoker, Cigarettes, 2018  Family History  CA - Cancer of colon: Negative: Mother.  CVA - Cerebrovascular accident: Mother.  Coronary artery bypass grafts x 3: Mother (Dx at 49).  Depression.: Mother and Father.  Diabetes mellitus: Father.  High cholesterol: Mother and Father.  Hyperlipidemia.: Sister.  Hypertension.: Mother and Father.  Renal cell carcinoma: Mother.  Renal failure: Mother.  Immunizations  Vaccine Date Status   influenza virus vaccine, inactivated 10/15/2017 Recorded   influenza virus vaccine, inactivated 10/11/2015 Recorded   tetanus/diphth/pertuss (Tdap) adult/adol 2015 Recorded   Health Maintenance  Health Maintenance  ???Pending?(in the next year)  ??? ??OverDue  ??? ? ? ?Coronary Artery Disease Maintenance-Lipid Lowering Therapy due??and every?  ??? ? ? ?Diabetes Screening due??and every?  ??? ? ? ?Breast Cancer Screening due??19??and every 2??year(s)  ??? ? ? ?Alcohol Misuse Screening due??20??and every  1??year(s)  ??? ? ? ?Smoking Cessation due??01/01/20??Variable frequency  ??? ??Due?  ??? ? ? ?ADL Screening due??05/12/20??and every 1??year(s)  ??? ? ? ?Coronary Artery Disease Maintenance-Antiplatelet Agent Prescribed due??05/12/20??and every?  ??? ? ? ?Depression Screening due??05/12/20??and every?  ??? ??Due In Future?  ??? ? ? ?Obesity Screening not due until??01/01/21??and every 1??year(s)  ???Satisfied?(in the past 1 year)  ??? ??Satisfied?  ??? ? ? ?Blood Pressure Screening on??05/12/20.??Satisfied by Mesha Olguin CMA.  ??? ? ? ?Body Mass Index Check on??05/12/20.??Satisfied by Mesha Olguin CMA  ??? ? ? ?Colorectal Screening on??06/07/19.??Satisfied by Karissa Purdy  ??? ? ? ?Coronary Artery Disease Maintenance-Lipid Lowering Therapy on??05/12/20.??Satisfied by Areli Ro NP  ??? ? ? ?Obesity Screening on??05/12/20.??Satisfied by Mesha Olguin CMA  ?      Saw pt today, agree left ABD wall mass lipoma, recommend observation only.? Agree with workup and TX.

## 2022-05-25 PROBLEM — Z23 IMMUNIZATION DUE: Status: ACTIVE | Noted: 2022-05-25

## 2022-05-25 PROBLEM — E55.9 VITAMIN D DEFICIENCY: Status: ACTIVE | Noted: 2022-05-25

## 2022-05-25 PROBLEM — E78.00 HYPERCHOLESTEROLEMIA: Status: ACTIVE | Noted: 2022-05-25

## 2022-05-25 PROBLEM — Z00.00 ENCOUNTER FOR WELLNESS EXAMINATION IN ADULT: Status: ACTIVE | Noted: 2022-05-25

## 2022-05-25 PROBLEM — N63.0 MASS OF BREAST: Status: ACTIVE | Noted: 2022-05-25

## 2022-08-29 PROBLEM — Z00.00 ENCOUNTER FOR WELLNESS EXAMINATION IN ADULT: Status: RESOLVED | Noted: 2022-05-25 | Resolved: 2022-08-29

## 2023-05-31 PROBLEM — Z00.00 ENCOUNTER FOR WELLNESS EXAMINATION IN ADULT: Status: ACTIVE | Noted: 2023-05-31

## 2023-09-04 PROBLEM — Z00.00 ENCOUNTER FOR WELLNESS EXAMINATION IN ADULT: Status: RESOLVED | Noted: 2023-05-31 | Resolved: 2023-09-04

## 2024-09-09 PROBLEM — Z00.00 ENCOUNTER FOR WELLNESS EXAMINATION IN ADULT: Status: RESOLVED | Noted: 2023-05-31 | Resolved: 2024-09-09

## 2024-10-15 ENCOUNTER — TELEPHONE (OUTPATIENT)
Dept: PHARMACY | Facility: CLINIC | Age: 59
End: 2024-10-15

## 2024-10-15 NOTE — TELEPHONE ENCOUNTER
[facility]Population [facility] Chart Review & Patient Outreach Details For Medication Adherence Project    Reason for Outreach Encounter: 3rd Party payor non-compliance report ([employer], [employer], C, etc)  2.  Patient Outreach Method: Reviewed patient chart   3.   Medication in question: pravastatin    LAST FILLED: n/a  4.  Reviewed and or Updates Made To: Patient Chart  5. Outreach Outcomes and/or actions taken: Unable to find on med list or in search history

## 2024-10-15 NOTE — TELEPHONE ENCOUNTER
[facility]Population [facility] Chart Review & Patient Outreach Details For Medication Adherence Project    Reason for Outreach Encounter: 3rd Party payor non-compliance report ([employer], [employer], C, etc)  2.  Patient Outreach Method: Reviewed Patient Chart  3.   Medication in question: pravastatin    LAST FILLED: 10/7/24 for 90 day supply  Hyperlipidemia Medications               pravastatin (PRAVACHOL) 40 MG tablet TAKE ONE TABLET EVERY EVENING               4.  Reviewed and or Updates Made To: Patient Chart  5. Outreach Outcomes and/or actions taken: Patient filled medication and is on track to be adherent

## 2024-12-28 ENCOUNTER — TELEPHONE (OUTPATIENT)
Dept: PHARMACY | Facility: CLINIC | Age: 59
End: 2024-12-28

## 2024-12-28 NOTE — TELEPHONE ENCOUNTER
[facility] Chart Review & Patient Outreach Details For Medication Adherence Project    Reason for Outreach Encounter: 3rd Party payor non-compliance report ([employer], [employer], [employer], etc)  2.  Patient Outreach Method: Reviewed patient chart   3.   Medication in question:    Hyperlipidemia Medications               pravastatin (PRAVACHOL) 40 MG tablet TAKE ONE TABLET EVERY EVENING                  Pravastatin  last filled  10/7/24 for 90 day supply      4.  Reviewed and or Updates Made To: Patient Chart  5. Outreach Outcomes and/or actions taken: Patient filled medication and is on track to be adherent  Additional Notes:

## 2025-04-28 ENCOUNTER — TELEPHONE (OUTPATIENT)
Dept: PHARMACY | Facility: CLINIC | Age: 60
End: 2025-04-28

## 2025-04-28 NOTE — TELEPHONE ENCOUNTER
[facility] Chart Review & Patient Outreach Details For Medication Adherence Project    Reason for Outreach Encounter: 3rd Party payor non-compliance report ([employer], [employer], [employer], etc)  2.  Patient Outreach Method: Reviewed patient chart   3.   Medication in question:    Hyperlipidemia Medications              pravastatin (PRAVACHOL) 40 MG tablet TAKE ONE TABLET EVERY EVENING                  LF 90 ds 10/7/24    4.  Reviewed and or Updates Made To: Patient Chart  5. Outreach Outcomes and/or actions taken: other  Additional Notes:

## 2025-06-10 ENCOUNTER — LAB VISIT (OUTPATIENT)
Dept: LAB | Facility: HOSPITAL | Age: 60
End: 2025-06-10
Attending: INTERNAL MEDICINE
Payer: COMMERCIAL

## 2025-06-10 DIAGNOSIS — K63.89 MALAKOPLAKIA OF ILEUM: Primary | ICD-10-CM

## 2025-06-10 DIAGNOSIS — Z86.0109 PERSONAL HISTORY OF OTHER COLON POLYPS: ICD-10-CM

## 2025-06-10 DIAGNOSIS — K57.30 DIVERTICULOSIS OF LARGE INTESTINE WITHOUT DIVERTICULITIS: ICD-10-CM

## 2025-06-10 DIAGNOSIS — K92.2 ACUTE GASTROINTESTINAL HEMORRHAGE: ICD-10-CM

## 2025-06-10 DIAGNOSIS — K63.5 HYPERPLASTIC POLYP OF INTESTINE: ICD-10-CM

## 2025-06-10 LAB
ALBUMIN SERPL-MCNC: 3.6 G/DL (ref 3.5–5)
ALBUMIN/GLOB SERPL: 1.1 RATIO (ref 1.1–2)
ALP SERPL-CCNC: 72 UNIT/L (ref 40–150)
ALT SERPL-CCNC: 10 UNIT/L (ref 0–55)
ANION GAP SERPL CALC-SCNC: 8 MEQ/L
AST SERPL-CCNC: 17 UNIT/L (ref 11–45)
BASOPHILS # BLD AUTO: 0.08 X10(3)/MCL
BASOPHILS NFR BLD AUTO: 1.2 %
BILIRUB SERPL-MCNC: 0.5 MG/DL
BUN SERPL-MCNC: 14.6 MG/DL (ref 9.8–20.1)
CALCIUM SERPL-MCNC: 9.3 MG/DL (ref 8.4–10.2)
CHLORIDE SERPL-SCNC: 107 MMOL/L (ref 98–107)
CO2 SERPL-SCNC: 29 MMOL/L (ref 22–29)
CREAT SERPL-MCNC: 0.76 MG/DL (ref 0.55–1.02)
CREAT/UREA NIT SERPL: 19
EOSINOPHIL # BLD AUTO: 0.32 X10(3)/MCL (ref 0–0.9)
EOSINOPHIL NFR BLD AUTO: 4.8 %
ERYTHROCYTE [DISTWIDTH] IN BLOOD BY AUTOMATED COUNT: 12.8 % (ref 11.5–17)
GFR SERPLBLD CREATININE-BSD FMLA CKD-EPI: >60 ML/MIN/1.73/M2
GLOBULIN SER-MCNC: 3.2 GM/DL (ref 2.4–3.5)
GLUCOSE SERPL-MCNC: 110 MG/DL (ref 74–100)
HCT VFR BLD AUTO: 41.4 % (ref 37–47)
HGB BLD-MCNC: 13.4 G/DL (ref 12–16)
IMM GRANULOCYTES # BLD AUTO: 0.03 X10(3)/MCL (ref 0–0.04)
IMM GRANULOCYTES NFR BLD AUTO: 0.4 %
IRON SATN MFR SERPL: 30 % (ref 20–50)
IRON SERPL-MCNC: 78 UG/DL (ref 50–170)
LYMPHOCYTES # BLD AUTO: 1.94 X10(3)/MCL (ref 0.6–4.6)
LYMPHOCYTES NFR BLD AUTO: 29.1 %
MCH RBC QN AUTO: 29.6 PG (ref 27–31)
MCHC RBC AUTO-ENTMCNC: 32.4 G/DL (ref 33–36)
MCV RBC AUTO: 91.4 FL (ref 80–94)
MONOCYTES # BLD AUTO: 0.51 X10(3)/MCL (ref 0.1–1.3)
MONOCYTES NFR BLD AUTO: 7.6 %
NEUTROPHILS # BLD AUTO: 3.79 X10(3)/MCL (ref 2.1–9.2)
NEUTROPHILS NFR BLD AUTO: 56.9 %
NRBC BLD AUTO-RTO: 0 %
PLATELET # BLD AUTO: 405 X10(3)/MCL (ref 130–400)
PMV BLD AUTO: 9.2 FL (ref 7.4–10.4)
POTASSIUM SERPL-SCNC: 4.4 MMOL/L (ref 3.5–5.1)
PROT SERPL-MCNC: 6.8 GM/DL (ref 6.4–8.3)
RBC # BLD AUTO: 4.53 X10(6)/MCL (ref 4.2–5.4)
SODIUM SERPL-SCNC: 144 MMOL/L (ref 136–145)
TIBC SERPL-MCNC: 182 UG/DL (ref 70–310)
TIBC SERPL-MCNC: 260 UG/DL (ref 250–450)
TRANSFERRIN SERPL-MCNC: 227 MG/DL (ref 180–382)
WBC # BLD AUTO: 6.67 X10(3)/MCL (ref 4.5–11.5)

## 2025-06-10 PROCEDURE — 80053 COMPREHEN METABOLIC PANEL: CPT

## 2025-06-10 PROCEDURE — 83550 IRON BINDING TEST: CPT

## 2025-06-10 PROCEDURE — 36415 COLL VENOUS BLD VENIPUNCTURE: CPT

## 2025-06-10 PROCEDURE — 85025 COMPLETE CBC W/AUTO DIFF WBC: CPT

## 2025-06-13 ENCOUNTER — LAB REQUISITION (OUTPATIENT)
Dept: LAB | Facility: HOSPITAL | Age: 60
End: 2025-06-13
Payer: COMMERCIAL

## 2025-06-13 DIAGNOSIS — K57.30 DIVERTICULOSIS OF LARGE INTESTINE WITHOUT PERFORATION OR ABSCESS WITHOUT BLEEDING: ICD-10-CM

## 2025-06-13 DIAGNOSIS — Z86.0109 PERSONAL HISTORY OF OTHER COLON POLYPS: ICD-10-CM

## 2025-06-13 DIAGNOSIS — K63.5 POLYP OF COLON: ICD-10-CM

## 2025-06-13 DIAGNOSIS — K92.2 GASTROINTESTINAL HEMORRHAGE, UNSPECIFIED: ICD-10-CM

## 2025-06-13 DIAGNOSIS — K63.89 OTHER SPECIFIED DISEASES OF INTESTINE: ICD-10-CM

## 2025-06-13 LAB
ADV 40+41 DNA STL QL NAA+NON-PROBE: NOT DETECTED
ASTRO TYP 1-8 RNA STL QL NAA+NON-PROBE: NOT DETECTED
C CAYETANENSIS DNA STL QL NAA+NON-PROBE: NOT DETECTED
C COLI+JEJ+UPSA DNA STL QL NAA+NON-PROBE: NOT DETECTED
C DIFF TOX A+B STL QL IA: NEGATIVE
CLOSTRIDIOIDES DIFFICILE GDH ANTIGEN (OHS): NEGATIVE
CRYPTOSP DNA STL QL NAA+NON-PROBE: NOT DETECTED
E HISTOLYT DNA STL QL NAA+NON-PROBE: NOT DETECTED
EAEC PAA PLAS AGGR+AATA ST NAA+NON-PRB: NOT DETECTED
EC STX1+STX2 GENES STL QL NAA+NON-PROBE: NOT DETECTED
EPEC EAE GENE STL QL NAA+NON-PROBE: NOT DETECTED
ETEC LTA+ST1A+ST1B TOX ST NAA+NON-PROBE: NOT DETECTED
G LAMBLIA DNA STL QL NAA+NON-PROBE: NOT DETECTED
NOROVIRUS GI+II RNA STL QL NAA+NON-PROBE: NOT DETECTED
P SHIGELLOIDES DNA STL QL NAA+NON-PROBE: NOT DETECTED
RVA RNA STL QL NAA+NON-PROBE: NOT DETECTED
S ENT+BONG DNA STL QL NAA+NON-PROBE: NOT DETECTED
SAPO I+II+IV+V RNA STL QL NAA+NON-PROBE: NOT DETECTED
SHIGELLA SP+EIEC IPAH ST NAA+NON-PROBE: NOT DETECTED
V CHOL+PARA+VUL DNA STL QL NAA+NON-PROBE: NOT DETECTED
V CHOLERAE DNA STL QL NAA+NON-PROBE: NOT DETECTED
Y ENTEROCOL DNA STL QL NAA+NON-PROBE: NOT DETECTED

## 2025-06-13 PROCEDURE — 82653 EL-1 FECAL QUANTITATIVE: CPT | Performed by: INTERNAL MEDICINE

## 2025-06-13 PROCEDURE — 83993 ASSAY FOR CALPROTECTIN FECAL: CPT | Performed by: INTERNAL MEDICINE

## 2025-06-13 PROCEDURE — 86318 IA INFECTIOUS AGENT ANTIBODY: CPT | Performed by: INTERNAL MEDICINE

## 2025-06-13 PROCEDURE — 87507 IADNA-DNA/RNA PROBE TQ 12-25: CPT | Performed by: INTERNAL MEDICINE

## 2025-06-16 LAB — CALPROTECTIN STL-MCNT: 61.6 MCG/G

## 2025-06-17 LAB — ELASTASE PANC STL-MCNT: >500 MCG/G

## 2025-07-19 ENCOUNTER — TELEPHONE (OUTPATIENT)
Dept: PHARMACY | Facility: CLINIC | Age: 60
End: 2025-07-19
Payer: COMMERCIAL

## 2025-07-19 NOTE — TELEPHONE ENCOUNTER
[facility]Population [facility] Chart Review & Patient Outreach Details For Medication Adherence Project    Reason for Outreach Encounter: 3rd Party payor non-compliance report ([employer], [employer], [employer], etc)  2.  Patient Outreach Method: Reviewed patient chart   3.   Medication in question:    Hyperlipidemia Medications              pravastatin (PRAVACHOL) 40 MG tablet TAKE ONE TABLET EVERY EVENING                  Pravastatin  last filled  3/26/25 for 90 day supply      4.  Reviewed and or Updates Made To: Patient Chart  5. Outreach Outcomes and/or actions taken: Medication discontinued  Additional Notes:

## 2025-07-22 PROCEDURE — 86803 HEPATITIS C AB TEST: CPT | Performed by: FAMILY MEDICINE

## 2025-08-12 ENCOUNTER — TELEPHONE (OUTPATIENT)
Dept: PHARMACY | Facility: CLINIC | Age: 60
End: 2025-08-12
Payer: COMMERCIAL